# Patient Record
Sex: FEMALE | Race: WHITE | Employment: STUDENT | URBAN - METROPOLITAN AREA
[De-identification: names, ages, dates, MRNs, and addresses within clinical notes are randomized per-mention and may not be internally consistent; named-entity substitution may affect disease eponyms.]

---
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
## 2022-03-06 NOTE — TELEPHONE ENCOUNTER
----- Message from Ivett Schneider DO sent at 1/3/2020  5:17 PM CST -----  Results reviewed. [facility]. Tests show no significant abnormalities. Negative screening for GC chlamydia. Not sexually active.   Screening guidelines per CDC per age no fever

## 2022-10-20 NOTE — PROGRESS NOTES
CHIEF COMPLAINT: Patient presents with:  Abdominal Pain: return from [facility] 1 week ago, stomach pain started, no appeitite, frequent loose stools     HPI:     Ney Perez is a 17year old female presents for lower abdominal cramping x 7 days.  No emesi /62 mmHg  Pulse 67  Temp(Src) 98.2 °F (36.8 °C) (Oral)  Resp 16  Wt 122 lb 6.4 oz  SpO2 98%  Vital signs reviewed. Appears stated age, well groomed. Physical Exam  General: Well-nourished, well hydrated. No acute distress. No pallor. No tachypnea. repeat in AM if no relief after breakfast.  Needs 64oz water and more if working out or playing sports. Increase fiber in diet  - XR ABDOMEN (KUB) (1 AP VIEW)  (CPT=74000); Future-STAT rule out kidney stone,  - ibuprofen 600 MG Oral Tab;  Take 1 tablet (60 Private car

## (undated) NOTE — MR AVS SNAPSHOT
[facility]  [address] [id number] [phone]               Thank you for choosing us for your health care visit with Yara Renee DO. We are glad to serve you and happy to provide you with this summary of your visit.   Pl Constipation (Adult)  Constipation is bowel movements that are less frequent than usual. Stools often become very hard and difficult to pass. This may lead to abdominal pain and bloating. It may also cause painful bowel movements.  Constipation may be due t · Unexpected vaginal bleeding  © 8761-6714 The [facility], [address] [place] [address]. All rights reserved. This information is not intended as a substitute for professional medical care.  Always follow your healthcare professional sunflower seeds. Keep Track of Your Fiber  A healthy diet includes 31 grams of fiber a day if you have a 2,000-calorie diet. Keep track of how much fiber you eat. Start by reading food labels. Then eat a variety of foods high in fiber.  Ask your doctor abo General care  · Rest as much as you can until your next exam. No strenuous activities. · Try to find positions that ease discomfort. A small pillow placed on the abdomen may help relieve pain.   · Something warm on your abdomen (such as a heating pad) may · Jaundice (yellow color of eyes and skin)  · Weakness, dizziness  · Chest, arm, back, neck or jaw pain  · Unexpected vaginal bleeding or missed period  · Can't keep down liquids or water and are getting dehydrated  Date Last Reviewed: 12/30/2015  © 2000-2 For medical emergencies, dial 911.                Visit [facility] online at  [email or web address]

## (undated) NOTE — LETTER
20      Varsha Marinelli        :  2000        To Whom It May Concern:    Christian Olsen was seen and evaluated in my office on 2020 for irregular periods. Her irregular cycles are a normal variation.   Her Prolactin levels were tested a

## (undated) NOTE — LETTER
Date: 01/02/2020  Patient Name: Montserrat Beckwith      To Whom it may concern:    Patient presented today to our office and was found to have an normal physical examination with Handy V sexual development.   She recently diagnosed with \"abnormal uterine bl

## (undated) NOTE — LETTER
January 8, 2020    Ej [address]      Dear Caro Waldron: The following are the results of your recent tests. Please review the list of test results.   Your result is the value on the left; we have also supplied the range of

## (undated) NOTE — MR AVS SNAPSHOT
[facility] [place]  [address] [place] [id number] [phone]               Thank you for choosing us for your health care visit with OTTO Rahman. We are glad to serve you and happy to provide you with this summary of your visit.   Ple spray. Do not use these medicines more often than directed on the label or symptoms may get worse. You may also use tablets containing pseudoephedrine. Avoid products that combine ingredients, because side effects may be increased. Read labels.  You can als No Known Allergies                Today's Vital Signs     BP Pulse Temp Weight          100/60 mmHg 78 97.9 °F (36.6 °C) (Oral) 116 lb (55 %*, Z = 0.12)      *Growth percentiles are based on CDC 2-20 Years data         Current Medications          This li An initiative of the American Academy of Pediatrics    Fact Sheet: Healthy Active Living for Families    Healthy nutrition starts as early as infancy with breastfeeding.  Once your baby begins eating solid foods, introduce nutritious foods early on and ofte

## (undated) NOTE — LETTER
Date: 12/19/2019    Patient Name: Lori Alonzo          To Whom it may concern:     This letter has been written at the patient's request.  Patient recently diagnosed with \"abnormal uterine bleeding/AUB\" at a screening, entrance physical.  Her menstru

## (undated) NOTE — LETTER
01/09/20    To Whom It May Concern,    Varsha Moyer Jolanta 4/11/2000 has been evaluated and has been found to have no concerns gynecologically. We have no further need to evaluate or treat her.       Respectfully,      OTTO Nguyen